# Patient Record
Sex: MALE | Race: WHITE | NOT HISPANIC OR LATINO | Employment: OTHER | URBAN - METROPOLITAN AREA
[De-identification: names, ages, dates, MRNs, and addresses within clinical notes are randomized per-mention and may not be internally consistent; named-entity substitution may affect disease eponyms.]

---

## 2023-05-20 ENCOUNTER — OFFICE VISIT (OUTPATIENT)
Dept: URGENT CARE | Facility: CLINIC | Age: 83
End: 2023-05-20
Payer: MEDICARE

## 2023-05-20 ENCOUNTER — APPOINTMENT (OUTPATIENT)
Dept: RADIOLOGY | Facility: IMAGING CENTER | Age: 83
End: 2023-05-20
Attending: PHYSICIAN ASSISTANT
Payer: MEDICARE

## 2023-05-20 VITALS
HEART RATE: 70 BPM | DIASTOLIC BLOOD PRESSURE: 80 MMHG | OXYGEN SATURATION: 97 % | SYSTOLIC BLOOD PRESSURE: 116 MMHG | TEMPERATURE: 97.2 F | WEIGHT: 180 LBS | HEIGHT: 63 IN | RESPIRATION RATE: 14 BRPM | BODY MASS INDEX: 31.89 KG/M2

## 2023-05-20 DIAGNOSIS — M54.6 ACUTE LEFT-SIDED THORACIC BACK PAIN: ICD-10-CM

## 2023-05-20 DIAGNOSIS — M47.814 SPONDYLOSIS OF THORACIC REGION WITHOUT MYELOPATHY OR RADICULOPATHY: ICD-10-CM

## 2023-05-20 PROCEDURE — 3079F DIAST BP 80-89 MM HG: CPT | Performed by: PHYSICIAN ASSISTANT

## 2023-05-20 PROCEDURE — 72072 X-RAY EXAM THORAC SPINE 3VWS: CPT | Mod: TC,FY | Performed by: PHYSICIAN ASSISTANT

## 2023-05-20 PROCEDURE — 3074F SYST BP LT 130 MM HG: CPT | Performed by: PHYSICIAN ASSISTANT

## 2023-05-20 PROCEDURE — 99203 OFFICE O/P NEW LOW 30 MIN: CPT | Performed by: PHYSICIAN ASSISTANT

## 2023-05-20 RX ORDER — CYCLOBENZAPRINE HCL 5 MG
5 TABLET ORAL 3 TIMES DAILY PRN
Qty: 30 TABLET | Refills: 0 | Status: SHIPPED | OUTPATIENT
Start: 2023-05-20 | End: 2023-05-20

## 2023-05-20 RX ORDER — CYCLOBENZAPRINE HCL 5 MG
5 TABLET ORAL 3 TIMES DAILY PRN
Qty: 30 TABLET | Refills: 0 | Status: SHIPPED | OUTPATIENT
Start: 2023-05-20

## 2023-05-20 RX ORDER — LIDOCAINE 50 MG/G
1 PATCH TOPICAL EVERY 24 HOURS
Qty: 10 PATCH | Refills: 0 | Status: SHIPPED | OUTPATIENT
Start: 2023-05-20

## 2023-05-20 RX ORDER — LIDOCAINE 50 MG/G
1 PATCH TOPICAL EVERY 24 HOURS
Qty: 10 PATCH | Refills: 0 | Status: SHIPPED | OUTPATIENT
Start: 2023-05-20 | End: 2023-05-20

## 2023-05-20 ASSESSMENT — ENCOUNTER SYMPTOMS: BACK PAIN: 1

## 2023-05-20 NOTE — PATIENT INSTRUCTIONS
Ice 20 minutes every hour while awake  Tylenol 650 mg every 6 hours as needed for pain  Lidoderm patch to painful area every 24 hours  Muscle relaxer every 8 hours as needed for spasm, caution sedation, do not drive

## 2023-05-20 NOTE — PROGRESS NOTES
"Subjective:   Emil Abebe is a 82 y.o. male who presents for Back Pain (X 6 days, slipped and hit back while going down the step. Has a pace maker.)  This is a very pleasant 82-year-old male who presents with 6-day history of back pain.  States he slipped and fell and has had ongoing pain since.  He did get a message to his primary care physician who prescribed a muscle relaxant, he was unable to pick this up due to some type of insurance issue.  He denies lower extremity pain paresthesias or weakness.  He has tried ice and heat.  He has tried over-the-counter analgesics.  6 days ago slip and fall, ongoing LBP  PCP tried to prescribe a muscle relaxer but it was not covered            Review of Systems   Musculoskeletal:  Positive for back pain.       Medications:  NON SPECIFIED    Allergies:             Patient has no known allergies.    Surgical History:       No past surgical history on file.    Past Social Hx:  Emil Abebe  reports that he has never smoked. He has never used smokeless tobacco. He reports current alcohol use. He reports that he does not use drugs.     Past Family Hx:   Emil Abebe family history is not on file.       Problem list, medications, and allergies reviewed by myself today in Epic.     Objective:     /80   Pulse 70   Temp 36.2 °C (97.2 °F) (Temporal)   Resp 14   Ht 1.6 m (5' 3\")   Wt 81.6 kg (180 lb)   SpO2 97%   BMI 31.89 kg/m²     Physical Exam  Vitals and nursing note reviewed.   Constitutional:       General: He is not in acute distress.     Appearance: Normal appearance.   Eyes:      Extraocular Movements: Extraocular movements intact.      Pupils: Pupils are equal, round, and reactive to light.   Cardiovascular:      Rate and Rhythm: Normal rate and regular rhythm.      Pulses: Normal pulses.      Heart sounds: Normal heart sounds.   Pulmonary:      Effort: Pulmonary effort is normal. No respiratory distress.      Breath sounds: Normal " breath sounds. No stridor. No wheezing or rhonchi.   Abdominal:      Palpations: Abdomen is soft.      Tenderness: There is no right CVA tenderness or left CVA tenderness.   Musculoskeletal:         General: Tenderness and signs of injury present.      Cervical back: Normal and normal range of motion.      Thoracic back: Normal.      Lumbar back: Spasms and tenderness present. No deformity or bony tenderness. Decreased range of motion. Negative right straight leg raise test and negative left straight leg raise test.      Right lower leg: No edema.      Left lower leg: No edema.      Comments: Diffuse tenderness to mid to lower thoracic paraspinous musculature without midline tenderness  Palpable spasm noted with taut bands  No midline spinal tenderness  Motor 5/5 b/l LE's  Sensation intact to LT/PP  DTRs 1+/symmetrical  Range of motion diminished by 50% in a multiplanar fashion  Gait non-antalgic  SLR negative b/l   Skin:     General: Skin is warm.   Neurological:      General: No focal deficit present.      Mental Status: He is alert and oriented to person, place, and time.      Sensory: No sensory deficit.      Motor: Motor function is intact. No weakness.      Gait: Gait normal.      Deep Tendon Reflexes: Reflexes are normal and symmetric. Reflexes normal.         RADIOLOGY RESULTS   DX-THORACIC SPINE-WITH SWIMMERS VIEW    Result Date: 5/20/2023 5/20/2023 3:24 PM HISTORY/REASON FOR EXAM:  Pain Following Trauma; thoracic pain.  Fall 8 days ago, back pain. TECHNIQUE/EXAM DESCRIPTION AND NUMBER OF VIEWS:  Thoracic spine, 3 views. COMPARISON:  None. FINDINGS: Vertebral alignment is preserved. Vertebral body heights are preserved. Multilevel loss of disc height and osteophyte formation. Pedicles appear intact. LEFT chest pacemaker in place. Minimal blunting of the posterior costophrenic angles.     1.  Moderate degenerative change of thoracic spine. 2.  No fracture or subluxation. 3.  Minimal bilateral pleural fluid  versus pleural reactive change.           *X-rays were reviewed and interpreted independently by me. I agree with the radiologist's findings     Assessment/Plan:     Diagnosis and Associated Orders:     1. Acute left-sided thoracic back pain  - Referral to Physical Therapy  - cyclobenzaprine (FLEXERIL) 5 mg tablet; Take 1 Tablet by mouth 3 times a day as needed for Muscle Spasms.  Dispense: 30 Tablet; Refill: 0  - lidocaine (LIDODERM) 5 % Patch; Place 1 Patch on the skin every 24 hours.  Dispense: 10 Patch; Refill: 0    2. Spondylosis of thoracic region without myelopathy or radiculopathy    Other orders  - NON SPECIFIED; Entresto pt does not know dosage.  - NON SPECIFIED; High blood pressure medication, pt does not know name or dosage.        Comments/MDM:  No radiographic evidence of fracture.  Diffuse degenerative changes throughout.  Referral placed to physical therapy.  Trial of low-dose Flexeril, caution sedation and driving.  Lidoderm patches.  Follow-up with primary care physician if symptoms do not resolve.  Did explain x-rays cannot completely rule out fracture, reassuring midline spine is not particularly tender.  Continue ice and heat as discussed.  I personally reviewed prior external notes and test results pertinent to today's visit. Supportive care, natural history, differential diagnoses, and indications for immediate follow-up discussed. Return to clinic or go to ED if symptoms worsen or persist.  Red flag symptoms discussed.  Patient/Parent/Guardian voices understanding. Follow-up with your primary care provider in 3-5 days.  All side effects of medication discussed including allergic response, GI upset, tendon injury, rash, sedation etc    Please note that this dictation was created using voice recognition software. I have made a reasonable attempt to correct obvious errors, but I expect that there are errors of grammar and possibly content that I did not discover before finalizing the  note.    This note was electronically signed by Ruby Braxton PA-C